# Patient Record
Sex: MALE | Race: WHITE | Employment: OTHER | URBAN - METROPOLITAN AREA
[De-identification: names, ages, dates, MRNs, and addresses within clinical notes are randomized per-mention and may not be internally consistent; named-entity substitution may affect disease eponyms.]

---

## 2020-01-03 ENCOUNTER — HOSPITAL ENCOUNTER (INPATIENT)
Age: 80
LOS: 2 days | Discharge: HOME | DRG: 194 | End: 2020-01-05
Attending: EMERGENCY MEDICINE | Admitting: INTERNAL MEDICINE
Payer: COMMERCIAL

## 2020-01-03 ENCOUNTER — APPOINTMENT (OUTPATIENT)
Dept: GENERAL RADIOLOGY | Age: 80
DRG: 194 | End: 2020-01-03
Payer: COMMERCIAL

## 2020-01-03 PROBLEM — R07.9 CHEST PAIN: Status: ACTIVE | Noted: 2020-01-03

## 2020-01-03 PROBLEM — I48.92 NEW ONSET ATRIAL FLUTTER (HCC): Status: ACTIVE | Noted: 2020-01-03

## 2020-01-03 PROBLEM — J18.9 COMMUNITY ACQUIRED PNEUMONIA: Status: ACTIVE | Noted: 2020-01-03

## 2020-01-03 LAB
ANION GAP SERPL CALCULATED.3IONS-SCNC: 10 MMOL/L (ref 3–16)
BASOPHILS ABSOLUTE: 0 K/UL (ref 0–0.2)
BASOPHILS RELATIVE PERCENT: 0.6 %
BUN BLDV-MCNC: 18 MG/DL (ref 7–20)
CALCIUM SERPL-MCNC: 8.9 MG/DL (ref 8.3–10.6)
CHLORIDE BLD-SCNC: 99 MMOL/L (ref 99–110)
CO2: 27 MMOL/L (ref 21–32)
CREAT SERPL-MCNC: 0.8 MG/DL (ref 0.8–1.3)
EKG ATRIAL RATE: 315 BPM
EKG ATRIAL RATE: 80 BPM
EKG DIAGNOSIS: NORMAL
EKG DIAGNOSIS: NORMAL
EKG P-R INTERVAL: 270 MS
EKG Q-T INTERVAL: 302 MS
EKG Q-T INTERVAL: 384 MS
EKG QRS DURATION: 90 MS
EKG QRS DURATION: 98 MS
EKG QTC CALCULATION (BAZETT): 408 MS
EKG QTC CALCULATION (BAZETT): 442 MS
EKG R AXIS: 34 DEGREES
EKG R AXIS: 68 DEGREES
EKG T AXIS: 67 DEGREES
EKG T AXIS: 69 DEGREES
EKG VENTRICULAR RATE: 110 BPM
EKG VENTRICULAR RATE: 80 BPM
EOSINOPHILS ABSOLUTE: 0.1 K/UL (ref 0–0.6)
EOSINOPHILS RELATIVE PERCENT: 1.4 %
GFR AFRICAN AMERICAN: >60
GFR NON-AFRICAN AMERICAN: >60
GLUCOSE BLD-MCNC: 179 MG/DL (ref 70–99)
HCT VFR BLD CALC: 40.8 % (ref 40.5–52.5)
HEMOGLOBIN: 13.5 G/DL (ref 13.5–17.5)
LYMPHOCYTES ABSOLUTE: 1.7 K/UL (ref 1–5.1)
LYMPHOCYTES RELATIVE PERCENT: 18.9 %
MCH RBC QN AUTO: 31.3 PG (ref 26–34)
MCHC RBC AUTO-ENTMCNC: 33.2 G/DL (ref 31–36)
MCV RBC AUTO: 94.2 FL (ref 80–100)
MONOCYTES ABSOLUTE: 0.8 K/UL (ref 0–1.3)
MONOCYTES RELATIVE PERCENT: 9.5 %
NEUTROPHILS ABSOLUTE: 6.1 K/UL (ref 1.7–7.7)
NEUTROPHILS RELATIVE PERCENT: 69.6 %
PDW BLD-RTO: 13 % (ref 12.4–15.4)
PLATELET # BLD: 243 K/UL (ref 135–450)
PMV BLD AUTO: 8.1 FL (ref 5–10.5)
POTASSIUM SERPL-SCNC: 4.2 MMOL/L (ref 3.5–5.1)
PRO-BNP: 1049 PG/ML (ref 0–449)
RBC # BLD: 4.33 M/UL (ref 4.2–5.9)
SODIUM BLD-SCNC: 136 MMOL/L (ref 136–145)
T4 FREE: 1.3 NG/DL (ref 0.9–1.8)
TROPONIN: <0.01 NG/ML
TROPONIN: <0.01 NG/ML
TSH SERPL DL<=0.05 MIU/L-ACNC: 1.23 UIU/ML (ref 0.27–4.2)
WBC # BLD: 8.7 K/UL (ref 4–11)

## 2020-01-03 PROCEDURE — 99231 SBSQ HOSP IP/OBS SF/LOW 25: CPT | Performed by: INTERNAL MEDICINE

## 2020-01-03 PROCEDURE — 99285 EMERGENCY DEPT VISIT HI MDM: CPT

## 2020-01-03 PROCEDURE — 84443 ASSAY THYROID STIM HORMONE: CPT

## 2020-01-03 PROCEDURE — 80048 BASIC METABOLIC PNL TOTAL CA: CPT

## 2020-01-03 PROCEDURE — 84484 ASSAY OF TROPONIN QUANT: CPT

## 2020-01-03 PROCEDURE — 2580000003 HC RX 258: Performed by: INTERNAL MEDICINE

## 2020-01-03 PROCEDURE — 83880 ASSAY OF NATRIURETIC PEPTIDE: CPT

## 2020-01-03 PROCEDURE — 6360000002 HC RX W HCPCS: Performed by: INTERNAL MEDICINE

## 2020-01-03 PROCEDURE — 71045 X-RAY EXAM CHEST 1 VIEW: CPT

## 2020-01-03 PROCEDURE — 2060000000 HC ICU INTERMEDIATE R&B

## 2020-01-03 PROCEDURE — 93005 ELECTROCARDIOGRAM TRACING: CPT | Performed by: EMERGENCY MEDICINE

## 2020-01-03 PROCEDURE — 93005 ELECTROCARDIOGRAM TRACING: CPT | Performed by: PHYSICIAN ASSISTANT

## 2020-01-03 PROCEDURE — 6370000000 HC RX 637 (ALT 250 FOR IP): Performed by: INTERNAL MEDICINE

## 2020-01-03 PROCEDURE — 36415 COLL VENOUS BLD VENIPUNCTURE: CPT

## 2020-01-03 PROCEDURE — 84439 ASSAY OF FREE THYROXINE: CPT

## 2020-01-03 PROCEDURE — 85025 COMPLETE CBC W/AUTO DIFF WBC: CPT

## 2020-01-03 RX ORDER — METOPROLOL TARTRATE 5 MG/5ML
5 INJECTION INTRAVENOUS EVERY 6 HOURS PRN
Status: DISCONTINUED | OUTPATIENT
Start: 2020-01-03 | End: 2020-01-05 | Stop reason: HOSPADM

## 2020-01-03 RX ORDER — ALBUTEROL SULFATE 90 UG/1
2 AEROSOL, METERED RESPIRATORY (INHALATION) EVERY 6 HOURS PRN
COMMUNITY

## 2020-01-03 RX ORDER — ONDANSETRON 2 MG/ML
4 INJECTION INTRAMUSCULAR; INTRAVENOUS EVERY 6 HOURS PRN
Status: DISCONTINUED | OUTPATIENT
Start: 2020-01-03 | End: 2020-01-05 | Stop reason: HOSPADM

## 2020-01-03 RX ORDER — DOCUSATE SODIUM 100 MG/1
100 CAPSULE, LIQUID FILLED ORAL 2 TIMES DAILY
COMMUNITY

## 2020-01-03 RX ORDER — SODIUM CHLORIDE 0.9 % (FLUSH) 0.9 %
10 SYRINGE (ML) INJECTION EVERY 12 HOURS SCHEDULED
Status: DISCONTINUED | OUTPATIENT
Start: 2020-01-03 | End: 2020-01-05 | Stop reason: HOSPADM

## 2020-01-03 RX ORDER — ENALAPRIL MALEATE 5 MG/1
5 TABLET ORAL DAILY
COMMUNITY

## 2020-01-03 RX ORDER — ATORVASTATIN CALCIUM 20 MG/1
20 TABLET, FILM COATED ORAL DAILY
Status: DISCONTINUED | OUTPATIENT
Start: 2020-01-04 | End: 2020-01-05 | Stop reason: HOSPADM

## 2020-01-03 RX ORDER — LANOLIN ALCOHOL/MO/W.PET/CERES
1000 CREAM (GRAM) TOPICAL DAILY
Status: DISCONTINUED | OUTPATIENT
Start: 2020-01-04 | End: 2020-01-05 | Stop reason: HOSPADM

## 2020-01-03 RX ORDER — DOCUSATE SODIUM 100 MG/1
100 CAPSULE, LIQUID FILLED ORAL 2 TIMES DAILY
Status: DISCONTINUED | OUTPATIENT
Start: 2020-01-03 | End: 2020-01-05 | Stop reason: HOSPADM

## 2020-01-03 RX ORDER — GABAPENTIN 300 MG/1
300 CAPSULE ORAL 2 TIMES DAILY PRN
Status: DISCONTINUED | OUTPATIENT
Start: 2020-01-03 | End: 2020-01-05 | Stop reason: HOSPADM

## 2020-01-03 RX ORDER — SODIUM CHLORIDE 0.9 % (FLUSH) 0.9 %
10 SYRINGE (ML) INJECTION PRN
Status: DISCONTINUED | OUTPATIENT
Start: 2020-01-03 | End: 2020-01-05 | Stop reason: HOSPADM

## 2020-01-03 RX ORDER — ACETAMINOPHEN 325 MG/1
650 TABLET ORAL EVERY 4 HOURS PRN
Status: DISCONTINUED | OUTPATIENT
Start: 2020-01-03 | End: 2020-01-05 | Stop reason: HOSPADM

## 2020-01-03 RX ORDER — GABAPENTIN 300 MG/1
300 CAPSULE ORAL 2 TIMES DAILY PRN
COMMUNITY

## 2020-01-03 RX ADMIN — AZITHROMYCIN MONOHYDRATE 500 MG: 500 INJECTION, POWDER, LYOPHILIZED, FOR SOLUTION INTRAVENOUS at 17:04

## 2020-01-03 RX ADMIN — Medication 10 ML: at 19:54

## 2020-01-03 RX ADMIN — DOCUSATE SODIUM 100 MG: 100 CAPSULE, LIQUID FILLED ORAL at 19:53

## 2020-01-03 RX ADMIN — ACETAMINOPHEN 650 MG: 325 TABLET ORAL at 18:39

## 2020-01-03 RX ADMIN — APIXABAN 5 MG: 5 TABLET, FILM COATED ORAL at 17:04

## 2020-01-03 ASSESSMENT — PAIN SCALES - GENERAL
PAINLEVEL_OUTOF10: 0
PAINLEVEL_OUTOF10: 0
PAINLEVEL_OUTOF10: 6
PAINLEVEL_OUTOF10: 0
PAINLEVEL_OUTOF10: 5
PAINLEVEL_OUTOF10: 0

## 2020-01-03 ASSESSMENT — PAIN DESCRIPTION - FREQUENCY
FREQUENCY: CONTINUOUS
FREQUENCY: INTERMITTENT

## 2020-01-03 ASSESSMENT — ENCOUNTER SYMPTOMS
NAUSEA: 0
DIARRHEA: 0
EYE REDNESS: 0
CHEST TIGHTNESS: 1
SHORTNESS OF BREATH: 1
VOMITING: 0
COUGH: 1
ABDOMINAL PAIN: 0

## 2020-01-03 ASSESSMENT — PAIN DESCRIPTION - LOCATION
LOCATION: CHEST
LOCATION: CHEST

## 2020-01-03 ASSESSMENT — PAIN DESCRIPTION - ONSET: ONSET: GRADUAL

## 2020-01-03 ASSESSMENT — PAIN DESCRIPTION - DESCRIPTORS
DESCRIPTORS: ACHING;PRESSURE
DESCRIPTORS: PRESSURE

## 2020-01-03 ASSESSMENT — PAIN DESCRIPTION - ORIENTATION
ORIENTATION: MID
ORIENTATION: MID

## 2020-01-03 ASSESSMENT — PAIN DESCRIPTION - PAIN TYPE: TYPE: ACUTE PAIN

## 2020-01-03 ASSESSMENT — HEART SCORE: ECG: 0

## 2020-01-03 ASSESSMENT — PAIN DESCRIPTION - PROGRESSION
CLINICAL_PROGRESSION: GRADUALLY WORSENING
CLINICAL_PROGRESSION: GRADUALLY WORSENING

## 2020-01-03 ASSESSMENT — PAIN - FUNCTIONAL ASSESSMENT: PAIN_FUNCTIONAL_ASSESSMENT: ACTIVITIES ARE NOT PREVENTED

## 2020-01-03 NOTE — ED NOTES
Home Med List is complete. Source of medications in list is patient interview, as well as confirming with pharmacy fills. Patient states that he did take his morning meds today. Please note:  1. Gabapentin -- patient states he rarely takes this, but does have supply at home for shooting leg pain. Per Jassi Shah, he last filled #90 caps in Nov 2018.   Added to the Home Med List per patient request.        Priti Reynoso PharmD., BCPS   1/3/2020 11:21 AM  Wireless: [phone]

## 2020-01-03 NOTE — PROGRESS NOTES
4 Eyes Admission Assessment     I agree as the admission nurse that 2 RN's have performed a thorough Head to Toe Skin Assessment on the patient. ALL assessment sites listed below have been assessed on admission. Areas assessed by both nurses: ***  [x]   Head, Face, and Ears   [x]   Shoulders, Back, and Chest  [x]   Arms, Elbows, and Hands   [x]   Coccyx, Sacrum, and Ischum  [x]   Legs, Feet, and Heels        Does the Patient have Skin Breakdown?   No         Sam Prevention initiated:  No   Wound Care Orders initiated:  No      [facility] nurse consulted for Pressure Injury (Stage 3,4, Unstageable, DTI, NWPT, and Complex wounds):  No      Nurse 1 eSignature: Electronically signed by Finesse Jackson RN on 1/3/20 at 6:55 PM    **SHARE this note so that the co-signing nurse is able to place an eSignature**    Nurse 2 eSignature: {Esignature:[id number]}

## 2020-01-03 NOTE — ED PROVIDER NOTES
[facility]          PHYSICIAN ASSISTANT NOTE       Date of evaluation: 1/3/2020    Chief Complaint     Chest Pain (began this am mid chest radiating to each side )      History of Present Illness     Luis Pena is a 67 y.o. male with PMH of HTN, HLD who presents with Chest pain. Patient states that he got up around 1 AM to use the restroom. He states that when he got back to bed he felt a tightness across his chest.  He states that this is been constant. It is worse with taking a deep breath and is worse with exertion. He has some mild shortness of breath. He denies any diaphoresis, dizziness, nausea or vomiting. He has never experienced this discomfort before. Patient states that he has had 3 bouts of bronchitis in the past 2 months. He states that he was recently treated with steroids and antibiotic (Z-shayna per chart review) and his symptoms have improved significantly. He reports only minimal residual cough. He denies any further fevers or chills, abdominal pain, change in bowel or urinary habits, leg swelling or pain. Patient states that he was noted by his primary care provider to have a irregular heartbeat last week and he states that he has an appointment on the 15th with a cardiologist to through [address] [place] [address]. Chart review reveals no prior EKGs for comparison. PCP noted PACs only during previous visit. Normal stress 2015. Review of Systems     Review of Systems   Constitutional: Negative for chills and fever. Eyes: Negative for redness. Respiratory: Positive for cough, chest tightness and shortness of breath. Cardiovascular: Positive for chest pain. Negative for palpitations and leg swelling. Gastrointestinal: Negative for abdominal pain, diarrhea, nausea and vomiting. Genitourinary: Negative for difficulty urinating. Musculoskeletal: Negative for gait problem. Skin: Negative for wound.    Neurological: Negative for dizziness, weakness and numbness. Psychiatric/Behavioral: The patient is not nervous/anxious. Past Medical, Surgical, Family, and Social History     He has a past medical history of Disc displacement, lumbar, Dyslipidemia, GERD (gastroesophageal reflux disease), Hyperlipidemia, Hypertension, and Spondylolisthesis. He has a past surgical history that includes Incision and Drainage of Neck Abscess; Colonoscopy; and lumbar laminectomy (10/2/2012). His family history is not on file. He reports that he has quit smoking. He does not have any smokeless tobacco history on file. He reports that he does not drink alcohol. Medications     Previous Medications    AMLODIPINE (NORVASC) 10 MG TABLET    Take 10 mg by mouth daily. ASPIRIN 81 MG CHEWABLE TABLET    Take 1 tablet by mouth 2 times daily. ATORVASTATIN (LIPITOR) 20 MG TABLET    Take 20 mg by mouth daily. CHOLECALCIFEROL (VITAMIN D3) 2000 UNITS CAPS    Take 1 tablet by mouth daily. DOCUSATE SODIUM 100 MG CAPS    Take 100 mg by mouth 2 times daily. ENALAPRIL-HYDROCHLOROTHIAZIDE (VASERETIC) 10-25 MG PER TABLET    Take 1 tablet by mouth daily. OMEPRAZOLE (PRILOSEC) 20 MG CAPSULE    Take 20 mg by mouth daily. POLYETHYLENE GLYCOL (MIRALAX) POWDER    Take 17 g by mouth daily. VITAMIN B-12 (CYANOCOBALAMIN) 1000 MCG TABLET    Take 1,000 mcg by mouth daily. Allergies     He has No Known Allergies. Physical Exam     INITIAL VITALS: BP: 111/65,Temp: 97.5 °F (36.4 °C), Pulse: 110, Resp: 20, SpO2: 95 %   Physical Exam  Vitals signs and nursing note reviewed. HENT:      Head: Normocephalic and atraumatic. Nose: Nose normal.      Mouth/Throat:      Mouth: Mucous membranes are moist.      Pharynx: Oropharynx is clear. Eyes:      Extraocular Movements: Extraocular movements intact. Conjunctiva/sclera: Conjunctivae normal.   Neck:      Musculoskeletal: Neck supple.    Cardiovascular:      Rate and Rhythm: Normal rate and regular rhythm. Pulmonary:      Effort: Pulmonary effort is normal. No respiratory distress. Breath sounds: Normal breath sounds. No wheezing, rhonchi or rales. Chest:      Chest wall: No tenderness. Abdominal:      General: Bowel sounds are normal. There is no distension. Palpations: Abdomen is soft. Tenderness: There is no tenderness. There is no guarding or rebound. Musculoskeletal:      Right lower leg: No edema. Left lower leg: No edema. Skin:     General: Skin is warm and dry. Neurological:      Mental Status: He is alert and oriented to person, place, and time. Psychiatric:         Mood and Affect: Mood normal.         Behavior: Behavior normal.         Diagnostic Results     EKG   Interpreted in conjunction with emergencydepartment physician Harriett Walton MD  Rhythm: atrial flutter  Rate: tachycardia and 100-110  Axis: left  Ectopy: none  Conduction: variable AV block  ST Segments: normal  T Waves:non specific changes  Q Waves: none  Clinical Impression: atrial flutter (new onset)  Comparison:  none    RADIOLOGY:  XR CHEST PORTABLE   Final Result      Focal opacity left midlung zone which could reflect pneumonia given the clinical history. Follow-up chest radiograph is recommended to document resolution.           LABS:   Results for orders placed or performed during the hospital encounter of 01/03/20   CBC auto differential   Result Value Ref Range    WBC 8.7 4.0 - 11.0 K/uL    RBC 4.33 4.20 - 5.90 M/uL    Hemoglobin 13.5 13.5 - 17.5 g/dL    Hematocrit 40.8 40.5 - 52.5 %    MCV 94.2 80.0 - 100.0 fL    MCH 31.3 26.0 - 34.0 pg    MCHC 33.2 31.0 - 36.0 g/dL    RDW 13.0 12.4 - 15.4 %    Platelets 597 253 - 816 K/uL    MPV 8.1 5.0 - 10.5 fL    Neutrophils % 69.6 %    Lymphocytes % 18.9 %    Monocytes % 9.5 %    Eosinophils % 1.4 %    Basophils % 0.6 %    Neutrophils Absolute 6.1 1.7 - 7.7 K/uL    Lymphocytes Absolute 1.7 1.0 - 5.1 K/uL    Monocytes Absolute 0.8 0.0 - 1.3 K/uL Eosinophils Absolute 0.1 0.0 - 0.6 K/uL    Basophils Absolute 0.0 0.0 - 0.2 K/uL   Basic Metabolic Panel   Result Value Ref Range    Sodium 136 136 - 145 mmol/L    Potassium 4.2 3.5 - 5.1 mmol/L    Chloride 99 99 - 110 mmol/L    CO2 27 21 - 32 mmol/L    Anion Gap 10 3 - 16    Glucose 179 (H) 70 - 99 mg/dL    BUN 18 7 - 20 mg/dL    CREATININE 0.8 0.8 - 1.3 mg/dL    GFR Non-African American >60 >60    GFR African American >60 >60    Calcium 8.9 8.3 - 10.6 mg/dL   Troponin (lab)   Result Value Ref Range    Troponin <0.01 <0.01 ng/mL   EKG 12 Lead   Result Value Ref Range    Ventricular Rate 110 BPM    Atrial Rate 315 BPM    QRS Duration 98 ms    Q-T Interval 302 ms    QTc Calculation (Bazett) 408 ms    R Axis 68 degrees    T Axis 67 degrees    Diagnosis       EKG performed in ER and to be interpreted by ER physician. Confirmed by DAYA JOYCE ([id number]), NISHA Cortez (HOWARD)) on 1/3/2020 8:32:08 AM   EKG 12 Lead   Result Value Ref Range    Ventricular Rate 80 BPM    Atrial Rate 80 BPM    P-R Interval 270 ms    QRS Duration 90 ms    Q-T Interval 384 ms    QTc Calculation (Bazett) 442 ms    R Axis 34 degrees    T Axis 69 degrees    Diagnosis       EKG performed in ER and to be interpreted by ER physician. Confirmed by DAYA JOYCE ([id number]), NISHA Cortez (HOWARD) (Franky) on 1/3/2020 10:29:21 AM     RECENT VITALS:  BP: 117/63, Temp: 97.9 °F (36.6 °C), Pulse: 83,Resp: 22, SpO2: 93 %       ED Course     Nursing Notes, Past Medical Hx, Past Surgical Hx, Social Hx, Allergies, and Family Hx were reviewed. The patient was given the followingmedications:  No orders of the defined types were placed in this encounter. CONSULTS:  None    MEDICAL DECISION MAKING / ASSESSMENT / Lay Moody is a 67 y.o. male with PMH of HTN, HLD who presents with Chest pain. Patient states that he woke up last night to use the restroom and when getting back into bed, he developed tightness across the chest with shortness of breath.  He denies

## 2020-01-03 NOTE — H&P
Hospital Medicine History & Physical      PCP: Ari Gates MD    Date of Admission: 1/3/2020    Date of Service: Pt seen/examined on 1/3/2020 and Admitted to Inpatient with expected LOS greater than two midnights due to medical therapy. Chief Complaint:  SOB, pleuritic chest pain      History Of Present Illness:    67 y.o. male who presented to [facility] for 1 day of pleuritic chest pain. Arrived as a tightness across his chest with associated shortness of breath. Denies fevers chills nausea or vomiting. Recent history of bronchitis, recently completed antibiotic regimen. In the ED, EKG showed he was in atrial flutter. Patient was not hypoxic. Chest x-ray showed left lung focal opacity. Past Medical History:          Diagnosis Date    Disc displacement, lumbar     Dyslipidemia     GERD (gastroesophageal reflux disease)     Hyperlipidemia     Hypertension     Spondylolisthesis        Past Surgical History:          Procedure Laterality Date    COLONOSCOPY      INCISION AND DRAINAGE OF NECK ABSCESS      LUMBAR LAMINECTOMY  10/2/2012    L5-S1,and fusion w/iliac bone graft       Medications Prior to Admission:      Prior to Admission medications    Medication Sig Start Date End Date Taking?  Authorizing Provider   docusate sodium (COLACE) 100 MG capsule Take 100 mg by mouth 2 times daily   Yes Historical Provider, MD   enalapril (VASOTEC) 5 MG tablet Take 5 mg by mouth daily   Yes Historical Provider, MD   albuterol sulfate  (90 Base) MCG/ACT inhaler Inhale 2 puffs into the lungs every 6 hours as needed for Wheezing   Yes Historical Provider, MD   dextromethorphan-guaiFENesin ([address] DM)  MG per extended release tablet Take 1 tablet by mouth 2 times daily For 10 days then stop 12/27/19 12/6/20 Yes Historical Provider, MD   hypromellose 0.4 % SOLN ophthalmic solution Place 1 drop into both eyes every 4 hours as needed (dry eyes)   Yes Historical Provider, MD   gabapentin lesions. Neurologic:  Neurovascularly intact without any focal sensory/motor deficits. Cranial nerves: II-XII intact, grossly non-focal.  Psychiatric:  Alert and oriented, thought content appropriate, normal insight  Capillary Refill: Brisk,< 3 seconds   Peripheral Pulses: +2 palpable, equal bilaterally       Labs:     Recent Labs     01/03/20  0900   WBC 8.7   HGB 13.5   HCT 40.8        Recent Labs     01/03/20 0900      K 4.2   CL 99   CO2 27   BUN 18   CREATININE 0.8   CALCIUM 8.9     No results for input(s): AST, ALT, BILIDIR, BILITOT, ALKPHOS in the last 72 hours. No results for input(s): INR in the last 72 hours. Recent Labs     01/03/20 0900   TROPONINI <0.01       Urinalysis:      Lab Results   Component Value Date    NITRU Negative 09/20/2012    BLOODU Negative 09/20/2012    SPECGRAV 1.010 09/20/2012    GLUCOSEU Negative 09/20/2012         XR CHEST PORTABLE   Final Result      Focal opacity left midlung zone which could reflect pneumonia given the clinical history. Follow-up chest radiograph is recommended to document resolution. ASSESSMENT:    Active Hospital Problems    Diagnosis Date Noted    Chest pain [R07.9] 01/03/2020    New onset atrial flutter ([address] [I48.92] 01/03/2020    Community acquired pneumonia [J18.9] 01/03/2020    Hypertension [I10]     Hyperlipidemia [E78.5]          PLAN:    Admit to PCU, cardiology consulted. Keep on telemetry. Start anticoagulation with Eliquis 5 mg twice daily.  -Continue statin, AC    Community-acquired pneumonia: Started on IV azithromycin 500 mg daily. Hypertension: History per chart. Currently not hypertensive. Continue to monitor    DVT Prophylaxis: Eliquis  Diet: DIET GENERAL;  Code Status: Full Code  PT/OT Eval Status: NA  Dispo -pending cardiology work-up       Tom Pierson MD    Thank you Ari Gates MD for the opportunity to be involved in this patient's care.  If you have any questions or concerns please feel free to contact me at [phone].

## 2020-01-03 NOTE — PROGRESS NOTES
Patient arrived to ED with c/o chest pain that started around 1am. Patient hooked up to monitor in room. Patient states it is 6/10. He did not take  Any pain medication at home before arrival. IV placed and labs drawn.  Will wait for orders

## 2020-01-03 NOTE — CONSULTS
Provider   docusate sodium (COLACE) 100 MG capsule Take 100 mg by mouth 2 times daily   Yes Historical Provider, MD   enalapril (VASOTEC) 5 MG tablet Take 5 mg by mouth daily   Yes Historical Provider, MD   albuterol sulfate  (90 Base) MCG/ACT inhaler Inhale 2 puffs into the lungs every 6 hours as needed for Wheezing   Yes Historical Provider, MD   dextromethorphan-guaiFENesin ([address] DM)  MG per extended release tablet Take 1 tablet by mouth 2 times daily For 10 days then stop 12/27/19 12/6/20 Yes Historical Provider, MD   hypromellose 0.4 % SOLN ophthalmic solution Place 1 drop into both eyes every 4 hours as needed (dry eyes)   Yes Historical Provider, MD   gabapentin (NEURONTIN) 300 MG capsule Take 300 mg by mouth 2 times daily as needed (shooting leg pain). Yes Historical Provider, MD   atorvastatin (LIPITOR) 20 MG tablet Take 20 mg by mouth daily. Yes Historical Provider, MD   vitamin B-12 (CYANOCOBALAMIN) 1000 MCG tablet Take 1,000 mcg by mouth daily. Yes Historical Provider, MD          Inpatient Medications:   [START ON 1/4/2020] atorvastatin  20 mg Oral Daily    docusate sodium  100 mg Oral BID    [START ON 1/4/2020] vitamin B-12  1,000 mcg Oral Daily    sodium chloride flush  10 mL Intravenous 2 times per day    [START ON 1/4/2020] influenza virus vaccine  0.5 mL Intramuscular Once    azithromycin  500 mg Intravenous Q24H    apixaban  5 mg Oral BID       IV drips:      PRN:  gabapentin, dextran 70-hypromellose, sodium chloride flush, magnesium hydroxide, ondansetron, perflutren lipid microspheres, metoprolol, acetaminophen    Allergy:     Patient has no known allergies. Review of Systems:     All 12 point review of symptoms completed. Pertinent positives identified in the HPI, all other review of symptoms negative as below. CONSTITUTIONAL: Nounanticipated weight loss. No change in energy level, sleep pattern, or activity level. SKIN: No rash or pruritis.   EYES: tenderness or deformity   Heart:  irregular rate and rhythm, S1, S2 normal, no murmur, rub or gallop PMI intact   Abdomen:   Soft, non-tender, bowel sounds active all four quadrants,  no masses, no organomegaly       Extremities: Extremities normal, atraumatic, no cyanosis or edema   Pulses: 2+ and symmetric   Skin: Skin color, texture, turgor normal, no rashes or lesions   Pysch: Normal mood and affect   Neurologic: Normal gross motor and sensory exam.  Cranial nerves intact        Labs:     Recent Labs     01/03/20  0900      K 4.2   BUN 18   CREATININE 0.8   CL 99   CO2 27   GLUCOSE 179*   CALCIUM 8.9     Recent Labs     01/03/20  0900   WBC 8.7   HGB 13.5   HCT 40.8      MCV 94.2     No results for input(s): CHOLTOT, TRIG, HDL in the last 72 hours. Invalid input(s): LIPIDCOMM, CHOLHDL, VLDCHOL, LDL  No results for input(s): PTT, INR in the last 72 hours. Invalid input(s): PT  Recent Labs     01/03/20  0900   TROPONINI <0.01     No results for input(s): BNP in the last 72 hours. Recent Labs     01/03/20  0900   TSH 1.23     No results for input(s): CHOL, HDL, LDLCALC, TRIG in the last 72 hours.]    Lab Results   Component Value Date    TROPONINI <0.01 01/03/2020         Imaging:     I personally reviewed imaging studies including CXR, Stress test, TTE/BOBBI. Last ECG (if available) - EKG:  I have reviewed EKG with the following interpretation  Atrial fibrillation    Telemetry:  A. fib, rate controlled    Last Stress (if available):    Last TTE/BOBBI(if available):    Last Cath (if available):    Last CMR  (if available):      Assessment / Plan:     New onset A. fib/a flutter/pneumonia/hypertension/hyperlipidemia  -Admitted and currently being treated for pneumonia. -Chest pain is pleuritic and non-concerning for cardiac origin. -TTE tomorrow.   Prior TTE was unremarkable.  -Continue anticoagulation with Eliquis.  -We will discuss with patient options about rate versus rhythm control once pneumonia is treated.  -As of now he is rate is controlled, may need low-dose beta-blocker to be started tomorrow if heart rate stays above 100. Currently is in the 70s to 80s range  -Continue BP and cholesterol medications. Tobacco use was discussed with the patient and educated on the negative effects. I have asked the patient to not utilize these agents. Thank you for allowing to us to participate in the care or Lino Rachel. Further evaluation will be based upon the patient's clinical course and testing results. I have spent 40 minutes of face to face time with the patient with more than 50% spent counseling and coordinating care. All questions and concerns were addressed to the patient/family. Alternatives to my treatment were discussed. The note was completed using EMR. Every effort was made to ensure accuracy; however, inadvertent computerized transcription errors may be present. I have personally reviewed the reports and images of labs, radiological studies, cardiac studies including ECG's and telemetry, current and old medical records. Renato Vallejo MD, [facility] [place], [facility]

## 2020-01-04 LAB
ALBUMIN SERPL-MCNC: 3.2 G/DL (ref 3.4–5)
ANION GAP SERPL CALCULATED.3IONS-SCNC: 10 MMOL/L (ref 3–16)
BUN BLDV-MCNC: 15 MG/DL (ref 7–20)
CALCIUM SERPL-MCNC: 8.8 MG/DL (ref 8.3–10.6)
CHLORIDE BLD-SCNC: 103 MMOL/L (ref 99–110)
CO2: 27 MMOL/L (ref 21–32)
CREAT SERPL-MCNC: 0.8 MG/DL (ref 0.8–1.3)
GFR AFRICAN AMERICAN: >60
GFR NON-AFRICAN AMERICAN: >60
GLUCOSE BLD-MCNC: 102 MG/DL (ref 70–99)
INR BLD: 1.19 (ref 0.86–1.14)
PHOSPHORUS: 3 MG/DL (ref 2.5–4.9)
POTASSIUM SERPL-SCNC: 4.3 MMOL/L (ref 3.5–5.1)
PROTHROMBIN TIME: 13.8 SEC (ref 10–13.2)
SODIUM BLD-SCNC: 140 MMOL/L (ref 136–145)

## 2020-01-04 PROCEDURE — 90686 IIV4 VACC NO PRSV 0.5 ML IM: CPT | Performed by: EMERGENCY MEDICINE

## 2020-01-04 PROCEDURE — 99232 SBSQ HOSP IP/OBS MODERATE 35: CPT | Performed by: NURSE PRACTITIONER

## 2020-01-04 PROCEDURE — 2060000000 HC ICU INTERMEDIATE R&B

## 2020-01-04 PROCEDURE — 85610 PROTHROMBIN TIME: CPT

## 2020-01-04 PROCEDURE — G0008 ADMIN INFLUENZA VIRUS VAC: HCPCS | Performed by: EMERGENCY MEDICINE

## 2020-01-04 PROCEDURE — 6360000002 HC RX W HCPCS: Performed by: INTERNAL MEDICINE

## 2020-01-04 PROCEDURE — 6370000000 HC RX 637 (ALT 250 FOR IP): Performed by: INTERNAL MEDICINE

## 2020-01-04 PROCEDURE — 36415 COLL VENOUS BLD VENIPUNCTURE: CPT

## 2020-01-04 PROCEDURE — 6360000002 HC RX W HCPCS: Performed by: EMERGENCY MEDICINE

## 2020-01-04 PROCEDURE — 80069 RENAL FUNCTION PANEL: CPT

## 2020-01-04 PROCEDURE — 2580000003 HC RX 258: Performed by: INTERNAL MEDICINE

## 2020-01-04 RX ORDER — OMEGA-3-ACID ETHYL ESTERS 1 G/1
1000 CAPSULE, LIQUID FILLED ORAL DAILY
Status: DISCONTINUED | OUTPATIENT
Start: 2020-01-04 | End: 2020-01-05 | Stop reason: HOSPADM

## 2020-01-04 RX ORDER — CHLORAL HYDRATE 500 MG
1000 CAPSULE ORAL DAILY
COMMUNITY

## 2020-01-04 RX ADMIN — Medication 10 ML: at 21:00

## 2020-01-04 RX ADMIN — ATORVASTATIN CALCIUM 20 MG: 20 TABLET, FILM COATED ORAL at 09:20

## 2020-01-04 RX ADMIN — MAGNESIUM HYDROXIDE 30 ML: 400 SUSPENSION ORAL at 16:44

## 2020-01-04 RX ADMIN — CYANOCOBALAMIN TAB 1000 MCG 1000 MCG: 1000 TAB at 09:20

## 2020-01-04 RX ADMIN — APIXABAN 5 MG: 5 TABLET, FILM COATED ORAL at 09:20

## 2020-01-04 RX ADMIN — DOCUSATE SODIUM 100 MG: 100 CAPSULE, LIQUID FILLED ORAL at 20:59

## 2020-01-04 RX ADMIN — DOCUSATE SODIUM 100 MG: 100 CAPSULE, LIQUID FILLED ORAL at 09:20

## 2020-01-04 RX ADMIN — APIXABAN 5 MG: 5 TABLET, FILM COATED ORAL at 20:59

## 2020-01-04 RX ADMIN — OMEGA-3-ACID ETHYL ESTERS 1000 MG: 1 CAPSULE, LIQUID FILLED ORAL at 12:59

## 2020-01-04 RX ADMIN — AZITHROMYCIN MONOHYDRATE 500 MG: 500 INJECTION, POWDER, LYOPHILIZED, FOR SOLUTION INTRAVENOUS at 16:44

## 2020-01-04 RX ADMIN — Medication 10 ML: at 09:20

## 2020-01-04 RX ADMIN — INFLUENZA A VIRUS A/BRISBANE/02/2018 IVR-190 (H1N1) ANTIGEN (PROPIOLACTONE INACTIVATED), INFLUENZA A VIRUS A/KANSAS/14/2017 X-327 (H3N2) ANTIGEN (PROPIOLACTONE INACTIVATED), INFLUENZA B VIRUS B/MARYLAND/15/2016 ANTIGEN (PROPIOLACTONE INACTIVATED), INFLUENZA B VIRUS B/PHUKET/3073/2013 BVR-1B ANTIGEN (PROPIOLACTONE INACTIVATED) 0.5 ML: 15; 15; 15; 15 INJECTION, SUSPENSION INTRAMUSCULAR at 09:21

## 2020-01-04 ASSESSMENT — PAIN SCALES - GENERAL
PAINLEVEL_OUTOF10: 0

## 2020-01-04 NOTE — PLAN OF CARE
Problem: Cardiac Output - Decreased:  Goal: Hemodynamic stability will improve  Description  Hemodynamic stability will improve  Outcome: Ongoing  Note:   Pt remains hemodynamically stable at this time. Vss. Denies chest pain, sob, lightheadedness, dizziness, or palpitations. Strict I/Os maintained with daily weights. A fib on tele, rate controlled. SpO2 remains >92% on room air . Will continue to monitor.

## 2020-01-04 NOTE — PLAN OF CARE
Problem: Pain:  Goal: Pain level will decrease  Description  Pain level will decrease  Outcome: Met This Shift  Note:   Denies pain at this time. Will update as needed. Problem: Cardiac Output - Decreased:  Goal: Hemodynamic stability will improve  Description  Hemodynamic stability will improve  1/4/2020 0119 by Иван Stern RN  Outcome: Met This Shift     Problem: Falls - Risk of:  Goal: Will remain free from falls  Description  Will remain free from falls  Outcome: Ongoing  Note:   Low risk for falls per ALVARADO. Up ad rosey, steady on feet. Agrees to call for assistance if needed. Call light and bedside items within reach. Will update as needed.

## 2020-01-04 NOTE — PROGRESS NOTES
Hospital Medicine Progress Note      Hospital course  67 y.o. male who presented to [facility] for 1 day of pleuritic chest pain. Arrived as a tightness across his chest with associated shortness of breath. Denies fevers chills nausea or vomiting. Recent history of bronchitis, recently completed antibiotic regimen. In the ED, EKG showed he was in atrial flutter. Patient was not hypoxic. Chest x-ray showed left lung focal opacity. Subjective- A flutter continues this morning. Patient is asymptomatic. PHYSICAL EXAM PERFORMED:    /78   Pulse 78   Temp 97.9 °F (36.6 °C) (Oral)   Resp 16   Ht 6' 1\" (1.854 m)   Wt 207 lb 7.3 oz (94.1 kg)   SpO2 95%   BMI 27.37 kg/m²     General appearance:  No apparent distress, appears stated age and cooperative. HEENT:  Normal cephalic, atraumatic without obvious deformity. Pupils equal, round, and reactive to light. Extra ocular muscles intact. Conjunctivae/corneas clear. Neck: Supple, with full range of motion. No jugular venous distention. Trachea midline. Respiratory:  Normal respiratory effort. Clear to auscultation, bilaterally without Rales/Wheezes/Rhonchi. Cardiovascular: Irregularly irregular rhythm with normal S1/S2 without murmurs, rubs or gallops. Abdomen: Soft, non-tender, non-distended with normal bowel sounds. Musculoskeletal:  No clubbing, cyanosis or edema bilaterally. Full range of motion without deformity. Skin: Skin color, texture, turgor normal.  No rashes or lesions. Neurologic:  Neurovascularly intact without any focal sensory/motor deficits.  Cranial nerves: II-XII intact, grossly non-focal.  Psychiatric:  Alert and oriented, thought content appropriate, normal insight  Capillary Refill: Brisk,< 3 seconds   Peripheral Pulses: +2 palpable, equal bilaterally       Labs:     Recent Labs     01/03/20  0900   WBC 8.7   HGB 13.5   HCT 40.8        Recent Labs     01/03/20  0900 01/04/20  0453    140   K 4.2 4.3 CL 99 103   CO2 27 27   BUN 18 15   CREATININE 0.8 0.8   CALCIUM 8.9 8.8   PHOS  --  3.0     No results for input(s): AST, ALT, BILIDIR, BILITOT, ALKPHOS in the last 72 hours. Recent Labs     01/04/20  0453   INR 1.19*     Recent Labs     01/03/20  0900 01/03/20  1856   TROPONINI <0.01 <0.01       Urinalysis:      Lab Results   Component Value Date    NITRU Negative 09/20/2012    BLOODU Negative 09/20/2012    SPECGRAV 1.010 09/20/2012    GLUCOSEU Negative 09/20/2012         XR CHEST PORTABLE   Final Result      Focal opacity left midlung zone which could reflect pneumonia given the clinical history. Follow-up chest radiograph is recommended to document resolution. ASSESSMENT:    Active Hospital Problems    Diagnosis Date Noted    Chest pain [R07.9] 01/03/2020    New onset atrial flutter ([address] [I48.92] 01/03/2020    Community acquired pneumonia [J18.9] 01/03/2020    Hypertension [I10]     Hyperlipidemia [E78.5]          PLAN:    New onset a flutter- cardiology consulted. Keep on telemetry. Start anticoagulation with Eliquis 5 mg twice daily. asymptomatic  -Continue statin, AC. Echocardiogram pending.  -Cardiology recommends cardioversion in 4 weeks after [facility]  -Chest pain improved today    Community-acquired pneumonia: Started on IV azithromycin 500 mg daily. Hypertension: History per chart. Currently not hypertensive.   Continue to monitor    DVT Prophylaxis: Eliquis  Diet: DIET GENERAL;  Code Status: Full Code  PT/OT Eval Status: NA  Dispo -pending echo       Tom Pierson MD

## 2020-01-05 VITALS
HEART RATE: 78 BPM | BODY MASS INDEX: 27.49 KG/M2 | SYSTOLIC BLOOD PRESSURE: 119 MMHG | TEMPERATURE: 97.4 F | RESPIRATION RATE: 18 BRPM | DIASTOLIC BLOOD PRESSURE: 86 MMHG | HEIGHT: 73 IN | WEIGHT: 207.45 LBS | OXYGEN SATURATION: 93 %

## 2020-01-05 PROCEDURE — 99232 SBSQ HOSP IP/OBS MODERATE 35: CPT | Performed by: NURSE PRACTITIONER

## 2020-01-05 PROCEDURE — 6370000000 HC RX 637 (ALT 250 FOR IP): Performed by: INTERNAL MEDICINE

## 2020-01-05 PROCEDURE — 2580000003 HC RX 258: Performed by: INTERNAL MEDICINE

## 2020-01-05 RX ORDER — AZITHROMYCIN 500 MG/1
500 TABLET, FILM COATED ORAL DAILY
Qty: 3 TABLET | Refills: 0 | Status: SHIPPED | OUTPATIENT
Start: 2020-01-05 | End: 2020-01-08

## 2020-01-05 RX ADMIN — APIXABAN 5 MG: 5 TABLET, FILM COATED ORAL at 09:47

## 2020-01-05 RX ADMIN — ATORVASTATIN CALCIUM 20 MG: 20 TABLET, FILM COATED ORAL at 09:47

## 2020-01-05 RX ADMIN — OMEGA-3-ACID ETHYL ESTERS 1000 MG: 1 CAPSULE, LIQUID FILLED ORAL at 09:48

## 2020-01-05 RX ADMIN — CYANOCOBALAMIN TAB 1000 MCG 1000 MCG: 1000 TAB at 09:47

## 2020-01-05 RX ADMIN — DOCUSATE SODIUM 100 MG: 100 CAPSULE, LIQUID FILLED ORAL at 09:47

## 2020-01-05 RX ADMIN — Medication 10 ML: at 09:48

## 2020-01-05 ASSESSMENT — PAIN SCALES - GENERAL
PAINLEVEL_OUTOF10: 0
PAINLEVEL_OUTOF10: 0

## 2020-01-05 NOTE — DISCHARGE SUMMARY
Hospital Medicine Discharge Summary    Patient ID: Dante Rodriguez      Patient's PCP: Jeffery Lange MD    Admit Date: 1/3/2020     Discharge Date: 1/5/2020      Admitting Physician: Vicki Salazar MD     Discharge Physician: Jesus Andres MD     Discharge Diagnoses: Active Hospital Problems    Diagnosis    Chest pain [R07.9]    New onset atrial flutter ([address] [I48.92]    Community acquired pneumonia [J18.9]    Hypertension [I10]    Hyperlipidemia [E78.5]       The patient was seen and examined on day of discharge and this discharge summary is in conjunction with any daily progress note from day of discharge. Hospital Course:     67 y.o. male who presented to [facility] for 1 day of pleuritic chest pain. Arrived as a tightness across his chest with associated shortness of breath. Denies fevers chills nausea or vomiting. Recent history of bronchitis, recently completed antibiotic regimen.     In the ED, EKG showed he was in atrial flutter. Patient was not hypoxic. Chest x-ray showed left lung focal opacity. New onset a flutter- cardiology consulted. Started anticoagulation with Eliquis 5 mg twice daily. asymptomatic  -Continued statin, AC. Echocardiogram can be done outpt as pt is stable and asymptomatic, no obvious signs he's fluid overloaded. He has appt with [facility] cardiology on 1/15.  -Cardiology recommends cardioversion in 4 weeks after [facility]  -Chest pain improved today.     Community-acquired pneumonia: Started on IV azithromycin 500 mg daily, continue PO for 3 more days.      Hypertension: History per chart. Currently not hypertensive. Physical Exam Performed:     /86   Pulse 78   Temp 97.4 °F (36.3 °C) (Oral)   Resp 18   Ht 6' 1\" (1.854 m)   Wt 207 lb 7.3 oz (94.1 kg)   SpO2 93%   BMI 27.37 kg/m²       General appearance:  No apparent distress, appears stated age and cooperative. HEENT:  Normal cephalic, atraumatic without obvious deformity.  Pupils equal, activity as tolerated    Diet: cardiac diet      Discharge Medications:     Discharge Medication List as of 1/5/2020 10:07 AM           Details   apixaban (ELIQUIS) 5 MG TABS tablet Take 1 tablet by mouth 2 times daily, Disp-60 tablet, R-0Print      azithromycin (ZITHROMAX) 500 MG tablet Take 1 tablet by mouth daily for 3 days, Disp-3 tablet, R-0Print              Details   Omega-3 Fatty Acids (FISH OIL) 1000 MG CAPS Take 1,000 mg by mouth dailyHistorical Med      docusate sodium (COLACE) 100 MG capsule Take 100 mg by mouth 2 times dailyHistorical Med      enalapril (VASOTEC) 5 MG tablet Take 5 mg by mouth dailyHistorical Med      albuterol sulfate  (90 Base) MCG/ACT inhaler Inhale 2 puffs into the lungs every 6 hours as needed for WheezingHistorical Med      dextromethorphan-guaiFENesin (MUCINEX DM)  MG per extended release tablet Take 1 tablet by mouth 2 times daily For 10 days then stopHistorical Med      hypromellose 0.4 % SOLN ophthalmic solution Place 1 drop into both eyes every 4 hours as needed (dry eyes)Historical Med      gabapentin (NEURONTIN) 300 MG capsule Take 300 mg by mouth 2 times daily as needed (shooting leg pain). Historical Med      atorvastatin (LIPITOR) 20 MG tablet Take 20 mg by mouth daily. vitamin B-12 (CYANOCOBALAMIN) 1000 MCG tablet Take 1,000 mcg by mouth daily. Signed:    Montserrat Nicole MD   1/5/2020      Thank you Vicki Licea MD for the opportunity to be involved in this patient's care. If you have any questions or concerns please feel free to contact me at [phone].

## 2020-01-05 NOTE — PROGRESS NOTES
TROPONINI <0.01 <0.01     FASTING LIPID PANEL:  Lab Results   Component Value Date    TSH 1.23 01/03/2020     LIVER PROFILE:No results found for: AST, ALT, ALB    -----------------------------------------------------------------  Telemetry: Personally reviewed  AF/AFL, HR controlled    Objective:   Vitals: /86   Pulse 78   Temp 97.4 °F (36.3 °C) (Oral)   Resp 18   Ht 6' 1\" (1.854 m)   Wt 207 lb 7.3 oz (94.1 kg)   SpO2 93%   BMI 27.37 kg/m²   General appearance: alert, appears stated age and cooperative, No acute distress   Eyes: Conjunctiva and pupils normal and reactive  Skin: Skin color, texture, turgor normal. No rashes or ecchymosis. Neck: no JVD, supple, symmetrical, trachea midline   Lungs: , no accessory muscle use, no respiratory distress  Heart: irreg, not tachy  Abdomen: soft, non-tender; bowel sounds normal  Extremities: No edema, DP +  Psychiatric: normal insight and affect    Patient Active Problem List:     Hypertension     Hyperlipidemia     Disc displacement, lumbar     Spondylolisthesis     GERD (gastroesophageal reflux disease)     Chest pain     New onset atrial flutter ([address]     Community acquired pneumonia        Assessment & Plan:      1. AF/AFL  2. PNA    68 y/o man with a h/o HLD, HTN, GERD who p/w CP and found to have new onset AF,   [id number] 3. TSH 1.23 (1/3/20).      pAF  - In AF - HR controlled  - On apixaban 5 mg   - Would anticoagulate x 4 weeks and then consider DCCV  - Patient has an appmt with [place] [facility] [place]  - Echo - would do outpatient as patient is requesting to leave and has no obvious s/s of fluid overload    CP  - Appears atypical  - Trops < 0.01  - MPI 2015 - no reversible ischemia  - F/u outpatient if s/s persist        Pablo Larose [address]

## 2020-01-10 ENCOUNTER — HOSPITAL ENCOUNTER (OUTPATIENT)
Dept: NON INVASIVE DIAGNOSTICS | Age: 80
Discharge: HOME | End: 2020-01-10
Payer: COMMERCIAL

## 2020-01-10 LAB
LV EF: 58 %
LVEF MODALITY: NORMAL

## 2020-01-10 PROCEDURE — C8929 TTE W OR WO FOL WCON,DOPPLER: HCPCS

## 2020-01-10 PROCEDURE — 6360000004 HC RX CONTRAST MEDICATION: Performed by: INTERNAL MEDICINE

## 2020-01-10 RX ADMIN — PERFLUTREN 2.2 MG: 6.52 INJECTION, SUSPENSION INTRAVENOUS at 08:23
